# Patient Record
Sex: FEMALE | Race: WHITE | NOT HISPANIC OR LATINO | Employment: FULL TIME | URBAN - METROPOLITAN AREA
[De-identification: names, ages, dates, MRNs, and addresses within clinical notes are randomized per-mention and may not be internally consistent; named-entity substitution may affect disease eponyms.]

---

## 2017-05-10 ENCOUNTER — HISTORICAL (OUTPATIENT)
Dept: LAB | Facility: HOSPITAL | Age: 42
End: 2017-05-10

## 2017-06-06 ENCOUNTER — HISTORICAL (OUTPATIENT)
Dept: ADMINISTRATIVE | Facility: HOSPITAL | Age: 42
End: 2017-06-06

## 2017-06-06 LAB
BUN SERPL-MCNC: 19 MG/DL (ref 7–18)
POC CREATININE: 0.6 MG/DL (ref 0.6–1.3)

## 2018-06-27 ENCOUNTER — HISTORICAL (OUTPATIENT)
Dept: RADIOLOGY | Facility: HOSPITAL | Age: 43
End: 2018-06-27

## 2019-02-28 ENCOUNTER — OFFICE VISIT (OUTPATIENT)
Dept: URGENT CARE | Facility: CLINIC | Age: 44
End: 2019-02-28
Payer: MEDICAID

## 2019-02-28 VITALS
HEART RATE: 87 BPM | OXYGEN SATURATION: 98 % | RESPIRATION RATE: 16 BRPM | TEMPERATURE: 99 F | WEIGHT: 190 LBS | DIASTOLIC BLOOD PRESSURE: 91 MMHG | BODY MASS INDEX: 37.3 KG/M2 | SYSTOLIC BLOOD PRESSURE: 164 MMHG | HEIGHT: 60 IN

## 2019-02-28 DIAGNOSIS — J32.9 RHINOSINUSITIS: Primary | ICD-10-CM

## 2019-02-28 PROCEDURE — 99203 OFFICE O/P NEW LOW 30 MIN: CPT | Mod: S$GLB,,, | Performed by: PHYSICIAN ASSISTANT

## 2019-02-28 PROCEDURE — 99203 PR OFFICE/OUTPT VISIT, NEW, LEVL III, 30-44 MIN: ICD-10-PCS | Mod: S$GLB,,, | Performed by: PHYSICIAN ASSISTANT

## 2019-02-28 RX ORDER — BUSPIRONE HYDROCHLORIDE 7.5 MG/1
TABLET ORAL
COMMUNITY
Start: 2015-06-22 | End: 2019-02-28

## 2019-02-28 RX ORDER — GEMFIBROZIL 600 MG/1
600 TABLET, FILM COATED ORAL
COMMUNITY
End: 2019-02-28

## 2019-02-28 RX ORDER — AZELASTINE 1 MG/ML
1 SPRAY, METERED NASAL 2 TIMES DAILY
Qty: 30 ML | Refills: 0 | Status: SHIPPED | OUTPATIENT
Start: 2019-02-28 | End: 2019-03-30

## 2019-02-28 RX ORDER — FLUOXETINE 20 MG/1
20 TABLET ORAL
COMMUNITY
End: 2019-02-28

## 2019-02-28 RX ORDER — AMITRIPTYLINE HYDROCHLORIDE 75 MG/1
75 TABLET ORAL
COMMUNITY
Start: 2015-08-18 | End: 2019-02-28

## 2019-02-28 RX ORDER — PREDNISONE 20 MG/1
20 TABLET ORAL DAILY
Qty: 3 TABLET | Refills: 0 | Status: SHIPPED | OUTPATIENT
Start: 2019-02-28 | End: 2019-03-03

## 2019-02-28 RX ORDER — OMEPRAZOLE 40 MG/1
40 CAPSULE, DELAYED RELEASE ORAL
COMMUNITY
End: 2019-02-28

## 2019-02-28 RX ORDER — LISINOPRIL AND HYDROCHLOROTHIAZIDE 20; 25 MG/1; MG/1
1 TABLET ORAL
COMMUNITY
End: 2019-02-28

## 2019-02-28 RX ORDER — PROMETHAZINE HYDROCHLORIDE AND DEXTROMETHORPHAN HYDROBROMIDE 6.25; 15 MG/5ML; MG/5ML
5 SYRUP ORAL NIGHTLY PRN
Qty: 118 ML | Refills: 0 | Status: SHIPPED | OUTPATIENT
Start: 2019-02-28 | End: 2019-03-10

## 2019-02-28 NOTE — PROGRESS NOTES
Subjective:       Patient ID: Leena Thomas is a 43 y.o. female.    Vitals:  height is 5' (1.524 m) and weight is 86.2 kg (190 lb). Her tympanic temperature is 98.7 °F (37.1 °C). Her blood pressure is 164/91 (abnormal) and her pulse is 87. Her respiration is 16 and oxygen saturation is 98%.     Chief Complaint: Sinus Problem    Sinus Problem   This is a new problem. Episode onset: 2 days. The problem has been gradually worsening since onset. There has been no fever. Her pain is at a severity of 7/10. The pain is moderate. Associated symptoms include chills, congestion, coughing, headaches and sinus pressure. Pertinent negatives include no diaphoresis, ear pain, shortness of breath or sore throat. Treatments tried: sinus med. The treatment provided no relief.       Constitution: Positive for chills. Negative for sweating, fatigue and fever.   HENT: Positive for congestion, postnasal drip, sinus pain and sinus pressure. Negative for ear pain, sore throat and voice change.    Neck: Negative for painful lymph nodes.   Eyes: Negative for eye redness.   Respiratory: Positive for cough. Negative for chest tightness, sputum production, bloody sputum, COPD, shortness of breath, stridor, wheezing and asthma.    Gastrointestinal: Negative for nausea and vomiting.   Musculoskeletal: Negative for muscle ache.   Skin: Negative for rash.   Allergic/Immunologic: Negative for seasonal allergies and asthma.   Neurological: Positive for headaches.   Hematologic/Lymphatic: Negative for swollen lymph nodes.       Objective:      Physical Exam   Constitutional: She is oriented to person, place, and time. She appears well-developed and well-nourished. She is cooperative.  Non-toxic appearance. She does not appear ill. No distress.   HENT:   Head: Normocephalic and atraumatic.   Right Ear: Hearing, tympanic membrane, external ear and ear canal normal.   Left Ear: Hearing, tympanic membrane, external ear and ear canal normal.   Nose:  Mucosal edema and rhinorrhea present. No nasal deformity. No epistaxis. Right sinus exhibits maxillary sinus tenderness. Right sinus exhibits no frontal sinus tenderness. Left sinus exhibits maxillary sinus tenderness. Left sinus exhibits no frontal sinus tenderness.   Mouth/Throat: Uvula is midline, oropharynx is clear and moist and mucous membranes are normal. No trismus in the jaw. Normal dentition. No uvula swelling. No posterior oropharyngeal erythema.   Eyes: Conjunctivae and lids are normal. No scleral icterus.   Sclera clear bilat   Neck: Trachea normal, full passive range of motion without pain and phonation normal. Neck supple.   Cardiovascular: Normal rate, regular rhythm, normal heart sounds, intact distal pulses and normal pulses.   Pulmonary/Chest: Effort normal and breath sounds normal. No accessory muscle usage or stridor. No tachypnea and no bradypnea. No respiratory distress. She has no decreased breath sounds. She has no wheezes. She has no rhonchi. She has no rales.   Abdominal: Soft. Normal appearance and bowel sounds are normal. She exhibits no distension. There is no tenderness.   Musculoskeletal: Normal range of motion. She exhibits no edema or deformity.   Lymphadenopathy:        Head (right side): No submandibular, no preauricular and no posterior auricular adenopathy present.        Head (left side): No submandibular, no preauricular and no posterior auricular adenopathy present.     She has cervical adenopathy.        Right cervical: Superficial cervical adenopathy present.        Left cervical: Superficial cervical adenopathy present.   Neurological: She is alert and oriented to person, place, and time. She exhibits normal muscle tone. Coordination normal.   Skin: Skin is warm, dry and intact. She is not diaphoretic. No pallor.   Psychiatric: She has a normal mood and affect. Her speech is normal and behavior is normal. Judgment and thought content normal. Cognition and memory are  normal.   Nursing note and vitals reviewed.        Patient is requesting steroids.  Discussed the risks of steroids with her elevated blood pressure, patient still wishes to receive the steroids.  Instructed to monitor blood pressure closely.  Assessment:       1. Rhinosinusitis        Plan:         Rhinosinusitis  -     predniSONE (DELTASONE) 20 MG tablet; Take 1 tablet (20 mg total) by mouth once daily. for 3 days  Dispense: 3 tablet; Refill: 0  -     azelastine (ASTELIN) 137 mcg (0.1 %) nasal spray; 1 spray (137 mcg total) by Nasal route 2 (two) times daily.  Dispense: 30 mL; Refill: 0  -     promethazine-dextromethorphan (PROMETHAZINE-DM) 6.25-15 mg/5 mL Syrp; Take 5 mLs by mouth nightly as needed.  Dispense: 118 mL; Refill: 0      Patient Instructions   - Rest.    - Drink plenty of fluids.      - Viral upper respiratory infections typically run their course in 10-14 days.     - Tylenol or Ibuprofen as directed as needed for fever/pain. Avoid tylenol if you have a history of liver disease. Do not take ibuprofen if you have a history of GI bleeding, kidney disease, or if you take blood thinners.     - You can take over-the-counter claritin, zyrtec, allegra, or xyzal as directed. These are antihistamines that can help with runny nose, nasal congestion, sneezing, and helps to dry up post-nasal drip, which usually causes sore throat and cough.    - You can use Flonase (fluticasone) nasal spray as directed for sinus congestion and postnasal drip. This is a steroid nasal spray that works locally over time to decrease the inflammation in your nose/sinuses and help with allergic symptoms. This is not an quick- relief spray like afrin, but it works well if used daily.  Discontinue if you develop nose bleed  - use nasal saline prior to Flonase.    - Use Ocean Spray Nasal Saline 1-3 puffs each nostril every 2-3 hours then blow out onto tissue. This is to irrigate the nasal passage way to clear the sinus openings. Use until  sinus problem resolved.    -asteline spray is a nasal antihistamine spray that helps with post nasal drip.       - you can take plain Mucinex (guaifenesin) 1200 mg twice a day to help loosen mucous    -warm salt water gargles can help with sore throat    - warm tea with honey can help with cough. Honey is a natural cough suppressant.    - You received a steroid today.  This can elevate your blood pressure, elevate your blood sugar, water weight gain, nervous energy, redness to the face and dimpling of the skin where the shot goes in.   - Do not use steroids more than 3 times per year.   - If you have diabetes, please check you blood sugar frequently.  - If you have high blood pressure, please check your blood pressure frequently.       - Only take the promethazine- DM as directed, as needed at night for cough. This medication may cause drowsiness.     - Follow up with your PCP or specialty clinic as directed in the next 1-2 weeks if not improved or as needed.  You can call [phone] to schedule an appointment with the appropriate provider.      - Go to the ER if you develop new or worsening symptoms.     - You must understand that you have received an Urgent Care treatment only and that you may be released before all of your medical problems are known or treated.   - You, the patient, will arrange for follow up care as instructed.   - If your condition worsens or fails to improve we recommend that you receive another evaluation at the ER immediately or contact your PCP to discuss your concerns or return here.     Elevated Blood Pressure  Your blood pressure was elevated during your visit to the urgent care.  It was not so high that immediate care was needed but it is recommended that you monitor your blood pressure over the next week or two to make sure that it is not staying elevated.  Please have your blood pressure taken 2-3 times daily at different times of the day.  Write all of those blood pressures down  and record the time that they were taken.  Keep all that information and take it with you to see your Primary Care Physician.  If your blood pressure is consistently above 140/90 you will need to follow up with your PCP more quickly        Sinusitis     The sinuses are air-filled spaces within the bones of the face. They connect to the inside of the nose. Sinusitis is an inflammation of the tissue lining the sinus cavity. Sinus inflammation can occur during a cold. It can also be due to allergies to pollens and other particles in the air. It can cause symptoms such as sinus congestion, headache, sore throat, facial swelling and fullness. It may also cause a low-grade fever. No infection is present, and no antibiotic treatment is needed.  Home care  · Drink plenty of water, hot tea, and other liquids. This may help thin mucus. It also may promote sinus drainage.  · Heat may help soothe painful areas of the face. Use a towel soaked in hot water. Or,  the shower and direct the hot spray onto your face. Using a vaporizer along with a menthol rub at night may also help.   · An expectorant containing guaifenesin may help thin the mucus and promote drainage from the sinuses.  · Over-the-counter decongestants may be used unless a similar medicine was prescribed. Nasal sprays work the fastest. Use one that contains phenylephrine or oxymetazoline. First blow the nose gently. Then use the spray. Do not use these medicines more often than directed on the label or symptoms may get worse. You may also use tablets containing pseudoephedrine. Avoid products that combine ingredients, because side effects may be increased. Read labels. You can also ask the pharmacist for help. (NOTE: Persons with high blood pressure should not use decongestants. They can raise blood pressure.)  · Over-the-counter antihistamines may help if allergies contributed to your sinusitis.    · Use acetaminophen or ibuprofen to control pain, unless  another pain medicine was prescribed. (If you have chronic liver or kidney disease or ever had a stomach ulcer, talk with your doctor before using these medicines. Aspirin should never be used in anyone under 18 years of age who is ill with a fever. It may cause severe liver damage.)  · Use nasal rinses or irrigation as instructed by your health care provider.  · Don't smoke. This can worsen symptoms.  Follow-up care  Follow up with your healthcare provider or our staff if you are not improving within the next week.  When to seek medical advice  Call your healthcare provider if any of these occur:  · Green or yellow discharge from the nose or into the throat  · Facial pain or headache becoming more severe  · Stiff neck  · Unusual drowsiness or confusion  · Swelling of the forehead or eyelids  · Vision problems, including blurred or double vision  · Fever of 100.4ºF (38ºC) or higher, or as directed by your healthcare provider  · Seizure  · Breathing problems  · Symptoms not resolving within 10 days  Date Last Reviewed: 4/13/2015  © 9097-2316 [employer]. [address] [place] [address]. All rights reserved. This information is not intended as a substitute for professional medical care. Always follow your healthcare professional's instructions.        Viral Upper Respiratory Illness (Adult)  You have a viral upper respiratory illness (URI), which is another term for the common cold. This illness is contagious during the first few days. It is spread through the air by coughing and sneezing. It may also be spread by direct contact (touching the sick person and then touching your own eyes, nose, or mouth). Frequent handwashing will decrease risk of spread. Most viral illnesses go away within 7 to 10 days with rest and simple home remedies. Sometimes the illness may last for several weeks. Antibiotics will not kill a virus, and they are generally not prescribed for this condition.    Home  care  · If symptoms are severe, rest at home for the first 2 to 3 days. When you resume activity, don't let yourself get too tired.  · Avoid being exposed to cigarette smoke (yours or others).  · You may use acetaminophen or ibuprofen to control pain and fever, unless another medicine was prescribed. (Note: If you have chronic liver or kidney disease, have ever had a stomach ulcer or gastrointestinal bleeding, or are taking blood-thinning medicines, talk with your healthcare provider before using these medicines.) Aspirin should never be given to anyone under 18 years of age who is ill with a viral infection or fever. It may cause severe liver or brain damage.  · Your appetite may be poor, so a light diet is fine. Avoid dehydration by drinking 6 to 8 glasses of fluids per day (water, soft drinks, juices, tea, or soup). Extra fluids will help loosen secretions in the nose and lungs.  · Over-the-counter cold medicines will not shorten the length of time youre sick, but they may be helpful for the following symptoms: cough, sore throat, and nasal and sinus congestion. (Note: Do not use decongestants if you have high blood pressure.)  Follow-up care  Follow up with your healthcare provider, or as advised.  When to seek medical advice  Call your healthcare provider right away if any of these occur:  · Cough with lots of colored sputum (mucus)  · Severe headache; face, neck, or ear pain  · Difficulty swallowing due to throat pain  · Fever of 100.4°F (38°C)  Call 911, or get immediate medical care  Call emergency services right away if any of these occur:  · Chest pain, shortness of breath, wheezing, or difficulty breathing  · Coughing up blood  · Inability to swallow due to throat pain  Date Last Reviewed: 9/13/2015  © 7662-3035 [employer]. [address] [place] [address], [place] [address]. All rights reserved. This information is not intended as a substitute for professional medical care. Always follow your  healthcare professional's instructions.

## 2019-02-28 NOTE — PATIENT INSTRUCTIONS
- Rest.    - Drink plenty of fluids.      - Viral upper respiratory infections typically run their course in 10-14 days.     - Tylenol or Ibuprofen as directed as needed for fever/pain. Avoid tylenol if you have a history of liver disease. Do not take ibuprofen if you have a history of GI bleeding, kidney disease, or if you take blood thinners.     - You can take over-the-counter claritin, zyrtec, allegra, or xyzal as directed. These are antihistamines that can help with runny nose, nasal congestion, sneezing, and helps to dry up post-nasal drip, which usually causes sore throat and cough.    - You can use Flonase (fluticasone) nasal spray as directed for sinus congestion and postnasal drip. This is a steroid nasal spray that works locally over time to decrease the inflammation in your nose/sinuses and help with allergic symptoms. This is not an quick- relief spray like afrin, but it works well if used daily.  Discontinue if you develop nose bleed  - use nasal saline prior to Flonase.    - Use Ocean Spray Nasal Saline 1-3 puffs each nostril every 2-3 hours then blow out onto tissue. This is to irrigate the nasal passage way to clear the sinus openings. Use until sinus problem resolved.    -asteline spray is a nasal antihistamine spray that helps with post nasal drip.       - you can take plain Mucinex (guaifenesin) 1200 mg twice a day to help loosen mucous    -warm salt water gargles can help with sore throat    - warm tea with honey can help with cough. Honey is a natural cough suppressant.    - You received a steroid today.  This can elevate your blood pressure, elevate your blood sugar, water weight gain, nervous energy, redness to the face and dimpling of the skin where the shot goes in.   - Do not use steroids more than 3 times per year.   - If you have diabetes, please check you blood sugar frequently.  - If you have high blood pressure, please check your blood pressure frequently.       - Only take the  promethazine- DM as directed, as needed at night for cough. This medication may cause drowsiness.     - Follow up with your PCP or specialty clinic as directed in the next 1-2 weeks if not improved or as needed.  You can call [phone] to schedule an appointment with the appropriate provider.      - Go to the ER if you develop new or worsening symptoms.     - You must understand that you have received an Urgent Care treatment only and that you may be released before all of your medical problems are known or treated.   - You, the patient, will arrange for follow up care as instructed.   - If your condition worsens or fails to improve we recommend that you receive another evaluation at the ER immediately or contact your PCP to discuss your concerns or return here.     Elevated Blood Pressure  Your blood pressure was elevated during your visit to the urgent care.  It was not so high that immediate care was needed but it is recommended that you monitor your blood pressure over the next week or two to make sure that it is not staying elevated.  Please have your blood pressure taken 2-3 times daily at different times of the day.  Write all of those blood pressures down and record the time that they were taken.  Keep all that information and take it with you to see your Primary Care Physician.  If your blood pressure is consistently above 140/90 you will need to follow up with your PCP more quickly        Sinusitis     The sinuses are air-filled spaces within the bones of the face. They connect to the inside of the nose. Sinusitis is an inflammation of the tissue lining the sinus cavity. Sinus inflammation can occur during a cold. It can also be due to allergies to pollens and other particles in the air. It can cause symptoms such as sinus congestion, headache, sore throat, facial swelling and fullness. It may also cause a low-grade fever. No infection is present, and no antibiotic treatment is needed.  Home  care  · Drink plenty of water, hot tea, and other liquids. This may help thin mucus. It also may promote sinus drainage.  · Heat may help soothe painful areas of the face. Use a towel soaked in hot water. Or,  the shower and direct the hot spray onto your face. Using a vaporizer along with a menthol rub at night may also help.   · An expectorant containing guaifenesin may help thin the mucus and promote drainage from the sinuses.  · Over-the-counter decongestants may be used unless a similar medicine was prescribed. Nasal sprays work the fastest. Use one that contains phenylephrine or oxymetazoline. First blow the nose gently. Then use the spray. Do not use these medicines more often than directed on the label or symptoms may get worse. You may also use tablets containing pseudoephedrine. Avoid products that combine ingredients, because side effects may be increased. Read labels. You can also ask the pharmacist for help. (NOTE: Persons with high blood pressure should not use decongestants. They can raise blood pressure.)  · Over-the-counter antihistamines may help if allergies contributed to your sinusitis.    · Use acetaminophen or ibuprofen to control pain, unless another pain medicine was prescribed. (If you have chronic liver or kidney disease or ever had a stomach ulcer, talk with your doctor before using these medicines. Aspirin should never be used in anyone under 18 years of age who is ill with a fever. It may cause severe liver damage.)  · Use nasal rinses or irrigation as instructed by your health care provider.  · Don't smoke. This can worsen symptoms.  Follow-up care  Follow up with your healthcare provider or our staff if you are not improving within the next week.  When to seek medical advice  Call your healthcare provider if any of these occur:  · Green or yellow discharge from the nose or into the throat  · Facial pain or headache becoming more severe  · Stiff neck  · Unusual drowsiness or  confusion  · Swelling of the forehead or eyelids  · Vision problems, including blurred or double vision  · Fever of 100.4ºF (38ºC) or higher, or as directed by your healthcare provider  · Seizure  · Breathing problems  · Symptoms not resolving within 10 days  Date Last Reviewed: 4/13/2015  © 3859-5748 [employer]. [address] [place] [address]. All rights reserved. This information is not intended as a substitute for professional medical care. Always follow your healthcare professional's instructions.        Viral Upper Respiratory Illness (Adult)  You have a viral upper respiratory illness (URI), which is another term for the common cold. This illness is contagious during the first few days. It is spread through the air by coughing and sneezing. It may also be spread by direct contact (touching the sick person and then touching your own eyes, nose, or mouth). Frequent handwashing will decrease risk of spread. Most viral illnesses go away within 7 to 10 days with rest and simple home remedies. Sometimes the illness may last for several weeks. Antibiotics will not kill a virus, and they are generally not prescribed for this condition.    Home care  · If symptoms are severe, rest at home for the first 2 to 3 days. When you resume activity, don't let yourself get too tired.  · Avoid being exposed to cigarette smoke (yours or others).  · You may use acetaminophen or ibuprofen to control pain and fever, unless another medicine was prescribed. (Note: If you have chronic liver or kidney disease, have ever had a stomach ulcer or gastrointestinal bleeding, or are taking blood-thinning medicines, talk with your healthcare provider before using these medicines.) Aspirin should never be given to anyone under 18 years of age who is ill with a viral infection or fever. It may cause severe liver or brain damage.  · Your appetite may be poor, so a light diet is fine. Avoid dehydration by drinking 6 to 8  glasses of fluids per day (water, soft drinks, juices, tea, or soup). Extra fluids will help loosen secretions in the nose and lungs.  · Over-the-counter cold medicines will not shorten the length of time youre sick, but they may be helpful for the following symptoms: cough, sore throat, and nasal and sinus congestion. (Note: Do not use decongestants if you have high blood pressure.)  Follow-up care  Follow up with your healthcare provider, or as advised.  When to seek medical advice  Call your healthcare provider right away if any of these occur:  · Cough with lots of colored sputum (mucus)  · Severe headache; face, neck, or ear pain  · Difficulty swallowing due to throat pain  · Fever of 100.4°F (38°C)  Call 911, or get immediate medical care  Call emergency services right away if any of these occur:  · Chest pain, shortness of breath, wheezing, or difficulty breathing  · Coughing up blood  · Inability to swallow due to throat pain  Date Last Reviewed: 9/13/2015  © 8910-2878 [employer]. [address] [place] [address], [place] [address]. All rights reserved. This information is not intended as a substitute for professional medical care. Always follow your healthcare professional's instructions.

## 2020-04-27 ENCOUNTER — HISTORICAL (OUTPATIENT)
Dept: ADMINISTRATIVE | Facility: HOSPITAL | Age: 45
End: 2020-04-27

## 2020-05-11 ENCOUNTER — HISTORICAL (OUTPATIENT)
Dept: ADMINISTRATIVE | Facility: HOSPITAL | Age: 45
End: 2020-05-11

## 2020-06-08 ENCOUNTER — HISTORICAL (OUTPATIENT)
Dept: ADMINISTRATIVE | Facility: HOSPITAL | Age: 45
End: 2020-06-08

## 2020-08-03 ENCOUNTER — HISTORICAL (OUTPATIENT)
Dept: ADMINISTRATIVE | Facility: HOSPITAL | Age: 45
End: 2020-08-03

## 2020-08-12 ENCOUNTER — HISTORICAL (OUTPATIENT)
Dept: RADIOLOGY | Facility: HOSPITAL | Age: 45
End: 2020-08-12

## 2020-08-20 ENCOUNTER — HISTORICAL (OUTPATIENT)
Dept: RADIOLOGY | Facility: HOSPITAL | Age: 45
End: 2020-08-20

## 2020-10-05 ENCOUNTER — HISTORICAL (OUTPATIENT)
Dept: ADMINISTRATIVE | Facility: HOSPITAL | Age: 45
End: 2020-10-05

## 2021-04-16 ENCOUNTER — HISTORICAL (OUTPATIENT)
Dept: RADIOLOGY | Facility: HOSPITAL | Age: 46
End: 2021-04-16

## 2022-02-28 ENCOUNTER — HISTORICAL (OUTPATIENT)
Dept: ADMINISTRATIVE | Facility: HOSPITAL | Age: 47
End: 2022-02-28

## 2022-03-28 ENCOUNTER — HISTORICAL (OUTPATIENT)
Dept: PREADMISSION TESTING | Facility: HOSPITAL | Age: 47
End: 2022-03-28

## 2022-03-28 ENCOUNTER — HISTORICAL (OUTPATIENT)
Dept: ADMINISTRATIVE | Facility: HOSPITAL | Age: 47
End: 2022-03-28

## 2022-03-28 LAB
ABS NEUT (OLG): 2.61 (ref 2.1–9.2)
BASOPHILS # BLD AUTO: 0 10*3/UL (ref 0–0.2)
BASOPHILS NFR BLD AUTO: 0 %
BUN SERPL-MCNC: 15.1 MG/DL (ref 7–18.7)
CALCIUM SERPL-MCNC: 9.5 MG/DL (ref 8.7–10.5)
CHLORIDE SERPL-SCNC: 106 MMOL/L (ref 98–107)
CO2 SERPL-SCNC: 29 MMOL/L (ref 22–29)
CREAT SERPL-MCNC: 0.74 MG/DL (ref 0.55–1.02)
CREAT/UREA NIT SERPL: 20
EOSINOPHIL # BLD AUTO: 0.2 10*3/UL (ref 0–0.9)
EOSINOPHIL NFR BLD AUTO: 4 %
ERYTHROCYTE [DISTWIDTH] IN BLOOD BY AUTOMATED COUNT: 13.5 % (ref 11.5–17)
GLUCOSE SERPL-MCNC: 88 MG/DL (ref 74–100)
HCT VFR BLD AUTO: 39.3 % (ref 37–47)
HEMOLYSIS INTERF INDEX SERPL-ACNC: 1
HGB BLD-MCNC: 12.7 G/DL (ref 12–16)
ICTERIC INTERF INDEX SERPL-ACNC: 1
LIPEMIC INTERF INDEX SERPL-ACNC: 4
LYMPHOCYTES # BLD AUTO: 1.5 10*3/UL (ref 0.6–4.6)
LYMPHOCYTES NFR BLD AUTO: 31 %
MANUAL DIFF? (OHS): NO
MCH RBC QN AUTO: 27.7 PG (ref 27–31)
MCHC RBC AUTO-ENTMCNC: 32.3 G/DL (ref 33–36)
MCV RBC AUTO: 85.6 FL (ref 80–94)
MONOCYTES # BLD AUTO: 0.5 10*3/UL (ref 0.1–1.3)
MONOCYTES NFR BLD AUTO: 10 %
NEUTROPHILS # BLD AUTO: 2.61 10*3/UL (ref 2.1–9.2)
NEUTROPHILS NFR BLD AUTO: 54 %
PLATELET # BLD AUTO: 174 10*3/UL (ref 130–400)
PMV BLD AUTO: 8.7 FL (ref 9.4–12.4)
POTASSIUM SERPL-SCNC: 4.6 MMOL/L (ref 3.5–5.1)
RBC # BLD AUTO: 4.59 10*6/UL (ref 4.2–5.4)
SODIUM SERPL-SCNC: 142 MMOL/L (ref 136–145)
WBC # SPEC AUTO: 4.8 10*3/UL (ref 4.5–11.5)

## 2022-04-05 ENCOUNTER — HISTORICAL (OUTPATIENT)
Dept: SURGERY | Facility: HOSPITAL | Age: 47
End: 2022-04-05

## 2022-04-10 ENCOUNTER — HISTORICAL (OUTPATIENT)
Dept: ADMINISTRATIVE | Facility: HOSPITAL | Age: 47
End: 2022-04-10
Payer: MEDICAID

## 2022-04-26 VITALS
SYSTOLIC BLOOD PRESSURE: 138 MMHG | DIASTOLIC BLOOD PRESSURE: 94 MMHG | BODY MASS INDEX: 37.66 KG/M2 | WEIGHT: 191.81 LBS | HEIGHT: 60 IN

## 2022-04-28 ENCOUNTER — HISTORICAL (OUTPATIENT)
Dept: SURGERY | Facility: HOSPITAL | Age: 47
End: 2022-04-28
Payer: MEDICAID

## 2022-05-14 NOTE — OP NOTE
Patient:   Leena Thomas            MRN: [id number]            FIN: [id number]               Age:   47 years     Sex:  Female     :  1975   Associated Diagnoses:   None   Author:   Raffi Singleton MD      SURGEON:  Raffi Singleton MD    PREOPERATIVE DIAGNOSIS(ES):  Left renal calculus.    POSTOPERATIVE DIAGNOSIS(ES):  Left renal calculus.    PROCEDURE(S)/OPERATION(S) PERFORMED:  1. Left ureteroscopy with Holmium laser lithotripsy.  2. Left double-J ureteral stent placement.    ANESTHESIA:  General.    FLUIDS ADMINISTERED:  1000 mL crystalloid.    ESTIMATED BLOOD LOSS:  4 mL.    SPECIMENS:  Left renal stone sent for stone analysis.    FINDINGS:   The patient was endoscopically and radiographically stone free at the end of the case.    COMPLICATIONS:  None.    DRAINS:  6-Icelandic x 22 cm left double-J ureteral stent. with string    SUMMARY:  After informed consent was obtained, the patient was brought to the operating room and placed  in the supine position. After adequate general anesthetic, the patient was positioned in dorsal  lithotomy and genitalia prepped and draped in a sterile manner. A time-out was performed with  the patient identified using 2 identifiers and the procedure site verified. The 21-Icelandic rigid  cystoscope was advanced into the bladder and the bladder was drained. The bladder was  thoroughly examined with 30 and 70-degree lenses. The ureteral orifices were in the normal  position. The indwelling double-J stent was visualized and brought outside the urethral meatus  with a grasper. We advanced an angle-tip Glidewire up the lumen of the stent into the ureter  and renal pelvis under fluoroscopic guidance. Next we passed a 12/14 Icelandic Cook Flexor ureteral access sheath over the working wire  and into the ureter under fluoroscopic guidance. It passed easily to the level of the proximal. The  inner dilator and working wire were then removed.    We then advanced a digital flexible  ureteroscope into the access sheath and examined  the ureter, renal pelvis, and calyces. We visualized stone in the lower pole. We used a 200 micron  Holmium laser fiber to fragment the stone.    Total laser energy utilized was 4.5J Joules. The stone was fragmented into fragments small  enough to remove through the access sheath. The fragments were then removed with a with a  2.2cm Monegasque Cook ncircle stone basket and removed. Once all fragments had been removed, we  examined all the calyces and renal pelvis again twice and no residual fragments were seen  endoscopically or radiographically. We injected 5 mL of contrast to opacify the renal pelvis, and  then removed the ureteroscope and access sheath. The ureter was carefully examined as we  removed the scope and there was no evidence of injury, perforation, or retained calculus.      We elected to leave an indwelling double-J stent to prevent obstruction due to edema or spasm.  The cystoscope was replaced into the bladder and the 6-Monegasque open-ended ureteral catheter  and Glidewire were advanced back into the ureter. We measured the ureter from the UPJ to the  UO and it measured 22 cm. We then deployed a double-J stent in a standard fashion with the  proximal curl in the renal pelvis and the distal curl in the bladder. We left an external stent  tether. The bladder was drained with the cystoscope and the cystoscope was removed. The  patient tolerated the procedure well, was extubated, and transported to the recovery room in  stable condition. The family was informed of the outcome following the procedure.

## 2022-05-14 NOTE — OP NOTE
Patient:   Leena Thomas            MRN: [id number]            FIN: [id number]               Age:   46 years     Sex:  Female     :  1975   Associated Diagnoses:   None   Author:   Raffi Singleton MD      Pre-operative Diagnosis: left renal stone    Post-operative Diagnosis: Same    Procedures Perfromed: left Extracorporeal shockwave lithotripsy  Cysto left ureteral stent placement with string    Surgeon: Dr. Singleton    Anesthesia: Genereal    Indications: Pt  who has a left renal stone and has failed spontaneous   passage and has elected to have ESWL. This is a possible staged procedure and may require  ureteroscopy and/or stent placement.    Findings: There was a 10 mm calculus present in the lower pole  of the l;eft kidney.  Procedure description: After induction of general anesthesia, the patient was placed on the  Dornier lithotripsy table in a supine position. The stones were localized by means of biplaner  fluoroscopy. SWL was then administered to the calculi at the F-2 focal point.    The patients stone was treated with a total of 2500 shocks with the total energy ranging between  2-7. The stone demonstrated good fragmentation at the end of the procedure.    We elected to leave an indwelling double-J stent to prevent obstruction due to edema or spasm.  The cystoscope was placed into the bladder and the 6-Senegalese open-ended ureteral catheter  and Glidewire were advanced back into the ureter. We measured the ureter from the UPJ to the  UO and it measured 2 cm. We then deployed a double-J stent in a standard fashion with the  proximal curl in the renal pelvis and the distal curl in the bladder.  we left a stent sting in place.  The bladder was drained with the cystoscope and the cystoscope was removed. The  patient tolerated the procedure well, was extubated, and transported to the recovery room in  stable condition. The family was informed of the outcome following the procedure.     Estimate  Blood Loss: None    Drains: 6fr by 22cm left ureteral stent.    Total IV Fluids: 1000ml    Specimens: None    Complications: None    Condition: Stable    Disposition: Discharge to home. Follow up in 2 weeks with a pre-clinic KUB.

## 2022-12-13 PROBLEM — N94.10 DYSPAREUNIA, FEMALE: Status: ACTIVE | Noted: 2022-12-13

## 2022-12-13 PROBLEM — Z90.710 H/O ABDOMINAL HYSTERECTOMY: Status: ACTIVE | Noted: 2022-12-13

## 2023-10-11 DIAGNOSIS — E89.0 HISTORY OF THYROIDECTOMY: Primary | ICD-10-CM

## 2024-03-04 ENCOUNTER — HOSPITAL ENCOUNTER (EMERGENCY)
Facility: HOSPITAL | Age: 49
Discharge: HOME | End: 2024-03-04
Attending: STUDENT IN AN ORGANIZED HEALTH CARE EDUCATION/TRAINING PROGRAM
Payer: MEDICAID

## 2024-03-04 VITALS
HEIGHT: 60 IN | DIASTOLIC BLOOD PRESSURE: 81 MMHG | TEMPERATURE: 99 F | OXYGEN SATURATION: 95 % | HEART RATE: 85 BPM | SYSTOLIC BLOOD PRESSURE: 119 MMHG | RESPIRATION RATE: 16 BRPM | BODY MASS INDEX: 38.74 KG/M2 | WEIGHT: 197.31 LBS

## 2024-03-04 DIAGNOSIS — M25.561 RIGHT KNEE PAIN: ICD-10-CM

## 2024-03-04 DIAGNOSIS — I10 UNCONTROLLED HYPERTENSION: Primary | ICD-10-CM

## 2024-03-04 DIAGNOSIS — M23.91 INTERNAL DERANGEMENT OF KNEE, RIGHT: ICD-10-CM

## 2024-03-04 PROCEDURE — 25000003 PHARM REV CODE 250: Performed by: STUDENT IN AN ORGANIZED HEALTH CARE EDUCATION/TRAINING PROGRAM

## 2024-03-04 PROCEDURE — 99283 EMERGENCY DEPT VISIT LOW MDM: CPT

## 2024-03-04 RX ORDER — CLONIDINE HYDROCHLORIDE 0.1 MG/1
0.2 TABLET ORAL
Status: COMPLETED | OUTPATIENT
Start: 2024-03-04 | End: 2024-03-04

## 2024-03-04 RX ADMIN — CLONIDINE HYDROCHLORIDE 0.2 MG: 0.1 TABLET ORAL at 09:03

## 2024-03-05 NOTE — ED PROVIDER NOTES
Encounter Date: 3/4/2024       History     Chief Complaint   Patient presents with    Knee Injury     Reports a fall one month prior and injury to the right knee; seen in an ER after that with unremarkable XR and placed in knee brace. Patient now noted numbness to the right lateral knee today; denies any new injury. Hypertensive; took meds this morning.     HPI    48-year-old female with a past medical history of hypertension, history of thyroid cancer status post thyroidectomy with acquired hypothyroidism, obesity and depression presents emergency department for chronic right knee pain.  Patient states he has been having knee pain for a few months now.  Went to an outside ER had x-ray which was showing only arthritis.  States that she wants to get a referral to orthopedist to get MRI.  No pain when she sits.  Only pain when she walks down ladders.  She also states that her blood pressure has been out of control lately because she used to take clonidine p.r.n. with her metoprolol and lisinopril but is currently in between doctors and does not have anymore clonidine.  Denies any chest pain shortness of breath, blurry vision chest pain or numbness or tingling.    Review of patient's allergies indicates:  No Known Allergies  Past Medical History:   Diagnosis Date    Depression     Hypertension     Hypothyroidism      Past Surgical History:   Procedure Laterality Date    BACK SURGERY       SECTION      CHOLECYSTECTOMY      HERNIA REPAIR      SHOULDER ARTHROSCOPY      Rotator Cuff    SPINE SURGERY       Family History   Problem Relation Age of Onset    Asthma Mother     Cancer Father      Social History     Tobacco Use    Smoking status: Never    Smokeless tobacco: Never   Substance Use Topics    Alcohol use: No    Drug use: No     Review of Systems   Constitutional:  Negative for fever.   HENT:  Negative for sore throat.    Respiratory:  Negative for cough and shortness of breath.    Cardiovascular:  Negative  for chest pain.   Gastrointestinal:  Negative for abdominal pain, constipation, diarrhea, nausea and vomiting.   Genitourinary:  Negative for dysuria.   Musculoskeletal:  Positive for arthralgias. Negative for back pain.   Skin:  Negative for rash.   Neurological:  Negative for weakness and headaches.   Hematological:  Does not bruise/bleed easily.   All other systems reviewed and are negative.      Physical Exam     Initial Vitals [03/04/24 2019]   BP Pulse Resp Temp SpO2   (!) 175/117 94 14 98.5 °F (36.9 °C) 98 %      MAP       --         Physical Exam    Nursing note and vitals reviewed.  Constitutional: She appears well-developed and well-nourished. No distress.   Cardiovascular:  Normal rate and regular rhythm.           Pulmonary/Chest: Breath sounds normal. No respiratory distress. She has no wheezes. She has no rhonchi. She has no rales.   Abdominal: Abdomen is soft. There is no abdominal tenderness. There is no rebound and no guarding.   Musculoskeletal:         General: Normal range of motion.      Right knee: Crepitus present. No swelling or deformity. Tenderness present over the lateral joint line. No LCL laxity, MCL laxity, ACL laxity or PCL laxity.      Instability Tests: Medial Tarah test positive.      Left knee: Normal.     Neurological: She is alert and oriented to person, place, and time. She has normal strength.   Skin: Skin is warm. Capillary refill takes less than 2 seconds.         ED Course   Procedures  Labs Reviewed - No data to display       Imaging Results    None          Medications   cloNIDine tablet 0.2 mg (0.2 mg Oral Given 3/4/24 2126)     Medical Decision Making  differential diagnosis  Osteoarthritis, internal knee derangement, hypertension, metabolic derangement, thyroid disorder,  as well as multiple other possible etiologies    Patient not wanting x-ray.  She states she simply wants a referral to orthopedist for MRI.  States she is interested in home physical  therapy.    Patient's blood pressure is elevated.  She is asymptomatic.  States she does not want any blood work or other evaluation regards to her blood pressure because she has an appointment upcoming with her endocrinologist.  We will give her a clonidine and a prescription of clonidine p.r.n..    Problems Addressed:  Internal derangement of knee, right: chronic illness or injury  Right knee pain: chronic illness or injury  Uncontrolled hypertension: chronic illness or injury    Amount and/or Complexity of Data Reviewed  External Data Reviewed: radiology.     Details: Osteophytes noted.  Mild arthritic changes    Risk  Prescription drug management.  Diagnosis or treatment significantly limited by social determinants of health.  Risk Details: Offered patient blood work and other testing for elevated blood pressure.  She states that she route hold off.  Will give a dose of clonidine here and likely give her a prescription for clonidine p.r.n..    Offered patient x-ray of the knee.  States that she had 1 a few months back.  States she just needs a referral to orthopedist.               ED Course as of 03/04/24 2242   Mon Mar 04, 2024   2240 Blood pressure improved after clonidine.  She states she feels fine.  Will hold off on clonidine p.r.n. prescription secondary to significant decrease in blood pressure.  She is asymptomatic.  Will follow up with cardiologist this week in regards to blood pressure titration.  She will take her blood pressure b.i.d. at home. [BS]      ED Course User Index  [BS] Jose Martin Quispe MD                           Clinical Impression:  Final diagnoses:  [M25.561] Right knee pain  [I10] Uncontrolled hypertension (Primary)  [M23.91] Internal derangement of knee, right          ED Disposition Condition    Discharge Stable          ED Prescriptions    None       Follow-up Information       Follow up With Specialties Details Why Contact Info    [facility] Emergency  Medicine Go to  If symptoms worsen [address] [facility] [address] [phone]    [facility] Orthopedics Call   [address] [facility] [address] [phone]    Follow-up with cardiologist in regards to hypertensive medications  Go to                Jose Martin Quispe MD  03/04/24 2543

## 2024-03-19 ENCOUNTER — HOSPITAL ENCOUNTER (OUTPATIENT)
Dept: RADIOLOGY | Facility: HOSPITAL | Age: 49
Discharge: HOME | End: 2024-03-19
Attending: STUDENT IN AN ORGANIZED HEALTH CARE EDUCATION/TRAINING PROGRAM
Payer: MEDICAID

## 2024-03-19 ENCOUNTER — OFFICE VISIT (OUTPATIENT)
Dept: ORTHOPEDICS | Facility: CLINIC | Age: 49
End: 2024-03-19
Payer: MEDICAID

## 2024-03-19 VITALS
HEIGHT: 60 IN | DIASTOLIC BLOOD PRESSURE: 88 MMHG | TEMPERATURE: 99 F | WEIGHT: 200 LBS | SYSTOLIC BLOOD PRESSURE: 139 MMHG | BODY MASS INDEX: 39.27 KG/M2

## 2024-03-19 DIAGNOSIS — S89.90XA INJURY OF LATERAL COLLATERAL LIGAMENT (LCL) OF KNEE: Primary | ICD-10-CM

## 2024-03-19 DIAGNOSIS — M25.561 RIGHT KNEE PAIN: ICD-10-CM

## 2024-03-19 PROCEDURE — 73564 X-RAY EXAM KNEE 4 OR MORE: CPT | Mod: TC,RT

## 2024-03-19 PROCEDURE — 99213 OFFICE O/P EST LOW 20 MIN: CPT | Mod: PBBFAC,25

## 2024-03-19 NOTE — LETTER
March 19, 2024      [facility]  [address] [place] [id number]  Phone: [phone]       Patient: Leena Thomas   YOB: 1975  Date of Visit: 03/19/2024    To Whom It May Concern:    Alden Thomas  was at [facility] on 03/19/2024. The patient may return to work/school on 03/20/2025 with restrictions. Patient is restricted to no kneeling, stooping are crawling for 2 weeks If you have any questions or concerns, or if I can be of further assistance, please do not hesitate to contact me.    Sincerely,    Arlin Ramachandran MA

## 2024-03-19 NOTE — LETTER
March 19, 2024      [facility]  [facility] [facility] [address]  Phone: [phone]       Patient: Leena Thomas   YOB: 1975  Date of Visit: 03/19/2024    To Whom It May Concern:    Alden Thomas  was at [facility] on 03/19/2024. The patient may return to work/school on 03/20/2024 with restrictions.Patient is not to be kneel, stooping, crawling are climbing ladders for 4 weeks If you have any questions or concerns, or if I can be of further assistance, please do not hesitate to contact me.    Sincerely,    Arlin Ramachandran MA

## 2024-03-19 NOTE — PROGRESS NOTES
Subjective:    Patient ID: Leena Thomas is a 48 y.o. female  who presented to [facility] for new visit.    Chief Complaint: Pain of the Right Knee    History of Present Illness:  Leena Thomas presented today with knee pain involving the right knee for the past 1 month. Pain is located along lateral joint line and along patella. Also reports numbness along the LCL for the last 2 weeks. Quality of pain is described as  pins and needles with kneeling .  Inciting event: injured during a fall approx 1 month ago. Is unable to recall where she specifically fell on the knee. Pain is aggravated by any weight bearing and kneeling.  Patient has had no prior knee problems. Denies any previous falls/injuries to the knee. Evaluation to date: plain films and ER evaluation. Treatment to date: bracing, topical analgesics, oral analgesics, and home exercises. Attempted some home exercises. Was given a locking brace at the ED, but only wears occasionally with activity due to it not fitting properly. Given multiple rounds of IM steroids at ED (unable to specify how many). Has tried ibuprofen 800 mg every 4-6 hrs with no relief. Has been applying Voltaren gel 4x per day since the incident and has provided minimal relief.    Knee Review of Systems:  Swelling?  yes  Instability?  no  Mechanical sx?  no  <30 min AM stiffness? yes  Limited ROM? no  Fever/Chills? no    Comorbid Conditions:  Morbidly Obese  HTN - poorly controlled  Hypothyroidism    Objective:      Physical Exam:    /88   Temp 98.6 °F (37 °C)   Ht 5' (1.524 m)   Wt 90.7 kg (200 lb)   BMI 39.06 kg/m²     Appearance:  Normal gait/station  FWB  Alignment: Left: normal Right: normal   Soft tissue swelling: Left: no Right: yes  Effusion: Left:  Negative Right: Positive  Erythema: Left no Right: no  Ecchymosis: Left: no Right: yes, well healed scar present along patellar tendon  Atrophy: Left: no Right:  no    Palpation:  Knee Tenderness: Left: None Right: Lateral joint line and lateral collateral ligament    Range of motion:  Flexion (140): Left:  130 Right: 130  Extension (0): Left: 0 Right: 0    Strength:  Extension: Left 5/5  Pain: no     Right 4/5 Pain: no  Flexion: Left 5/5 Pain: no Right   4/5 Pain: no    Special Tests:  Ballotable Effusion:Left: Negative Right: Positive   Fluid Wave: Left: Negative Right: Positive   Crepitus: Left: Positive Right: Positive   Patellar grind test: Left: Negative  Right: Negative  Apprehension test: Left: Not performed Right: Not performed   Varus: @ 0, Left Negative Right: Positive.  @ 30, Left Negative  Right Negative   Valgus: @ 0, Left Negative Right: Negative.  @ 30, Left Negative  Right Negative  Lachman: Left: Negative Right: Negative   Ant Drawer: Left: Negative Right: Negative   Posterior Drawer: Left: Negative Right: Negative   Dial Test: Left: Not performed Right: Not performed   Tarah: Left: Negative Right: Negative   Apley's: Left: Not performed Right: Not performed  Thessaly's: Left: Not performed Right: Not performed   Noble Compression: Left: Not performed Right: Not performed   Mercy: Left: Not performed Right: Not performed     General appearance: NAD  Peripheral pulses: normal right  Sensation: absent along left lateral area of knee    Imaging:   Previous images performed. Images not available. Impression read.  X-rays ordered and performed today: yes  # of views: 4 Laterality: right  My Interpretation:   No acute fractures/dislocations. Minimal joint space narrowing and osteophyte formation consistent with mild osteoarthritis.     Assessment:        Encounter Diagnoses   Code Name Primary?    S89.90XA Injury of lateral collateral ligament (LCL) of knee Yes        Plan:     MDM: Prior external referring provider notes reviewed. Prior external referring provider studies reviewed.   Dx: Grade 1 Strain of LCL of right knee with dysthesia of the lateral  cutaneous sural nerve - new problem  Treatment Plan: Discussed with patient diagnosis, prognosis, and treatment recommendations. Education provided. Unsure if the nerve injury is due to the initial injury or to poorly fitting knee brace or entrapment.  Symptoms reported less than 1 month.  Recommend conservative treatment to include: avoidance of aggravating activity, significant modification of daily activities, hot/cold therapies, topical and oral medications, braces, HEP/PT/OT, and injections. Given work excuse for accommodations to limit kneeling/crawling at work. DME script for properly fitted locking brace. If symptoms do not improve or continue to worsen with conservative treatment in 4 weeks will consider advanced imaging/EMG/NCS at that time.  Imaging: radiological studies ordered and independently reviewed; discussed with patient; agree with radiologist interpretation.   Weight Management: is paramount. Recommend to discuss with PCP about medication and bariatric surgery options for weight loss if your BMI is >35 and applicable. A BMI of <24.9 may provide further relief..   Activity: Activity as tolerated; HEP to include aerobic conditioning and strength training with non-painful activity. ROM/STG exercises. Proper footware; assistive devises to avoid limping. Hinged knee brace as needed.   Therapy: Physical Therapy, HEP to include aerobic conditioning and strength training with non-painful activity  Medication: START over-the-counter acetaminophen (Tylenol 1000 mg three times per day as needed)  CONTINUE over-the-counter acetaminophen (Tylenol 1000 mg three times per day as needed)  CONTINUE Voltaren Gel 1% as prescribed  CONTINUE over-the-counter NSAIDs (ibuprofen 200mg three tablets three times a day as needed).   Please see your primary care physician for further refills.  RTC: 4 weeks.       Srinivasa Jeff MD  [facility]

## 2024-03-22 NOTE — PROGRESS NOTES
Faculty Attestation: Leena Thomas  was seen in Sports Medicine Clinic. Patient seen and evaluated at the time of the visit. History of Present Illness, Physical Exam, and Assessment and Plan reviewed. Treatment plan is reasonable and appropriate. Compliance with treatment recommendations is important.  Radiology images independently reviewed and agree with radiologist interpretation. No procedure was performed.     Omayra Duffy MD  Sports Medicine